# Patient Record
Sex: MALE | Race: WHITE
[De-identification: names, ages, dates, MRNs, and addresses within clinical notes are randomized per-mention and may not be internally consistent; named-entity substitution may affect disease eponyms.]

---

## 2018-02-15 ENCOUNTER — HOSPITAL ENCOUNTER (OUTPATIENT)
Dept: HOSPITAL 95 - ORSCMMR | Age: 63
Discharge: HOME | End: 2018-02-15
Payer: COMMERCIAL

## 2018-02-15 VITALS — BODY MASS INDEX: 30.29 KG/M2 | HEIGHT: 74.02 IN | WEIGHT: 235.98 LBS

## 2018-02-15 DIAGNOSIS — D12.4: ICD-10-CM

## 2018-02-15 DIAGNOSIS — Z87.891: ICD-10-CM

## 2018-02-15 DIAGNOSIS — D68.51: ICD-10-CM

## 2018-02-15 DIAGNOSIS — D12.2: ICD-10-CM

## 2018-02-15 DIAGNOSIS — K57.30: ICD-10-CM

## 2018-02-15 DIAGNOSIS — G47.33: ICD-10-CM

## 2018-02-15 DIAGNOSIS — Z79.01: ICD-10-CM

## 2018-02-15 DIAGNOSIS — Z12.11: Primary | ICD-10-CM

## 2018-02-15 DIAGNOSIS — Z86.718: ICD-10-CM

## 2018-02-15 PROCEDURE — 0DBK8ZX EXCISION OF ASCENDING COLON, VIA NATURAL OR ARTIFICIAL OPENING ENDOSCOPIC, DIAGNOSTIC: ICD-10-PCS | Performed by: INTERNAL MEDICINE

## 2018-02-15 PROCEDURE — 0DBM8ZX EXCISION OF DESCENDING COLON, VIA NATURAL OR ARTIFICIAL OPENING ENDOSCOPIC, DIAGNOSTIC: ICD-10-PCS | Performed by: INTERNAL MEDICINE

## 2023-10-12 NOTE — NUR
Patient up to Ambulate independently. Gait steady. VSS AND CONSISTENT WITH PT
BASELINE.  Discharge instructions reviewed with patient. Patient verbalizes
understanding.  Copy given to patient to take home.  Patient States
Post-Procedure ride home has been arranged.  Discharged via wheelchair to
private car for ride home.
PT BELONGINGS RETURNED TO PT.

## 2023-10-12 NOTE — NUR
REPORT RECEIVED FROM KAREN BROWN RN. VSS. PT ABLE TO REPOSITION SELF IN BED.
PT REQUESTING PO FLUIDS AND TOLERATING THEM WELL. PT DENIES PAIN, NAUSEA OR
OTHER DISCOMFORTS. PT SITTING UP CHATTING.

## 2023-10-12 NOTE — NUR
Ambulatory in Day Surgery. Patient states colon prep results clear.  History,
Chart, Medications and Allergies reviewed before start of procedure. Patient
confirms NPO status and agrees with scheduled surgery.  Patient States
Post-Procedure ride home has been arranged.  Pre-Op teaching done. Pt
verbalizes understanding.